# Patient Record
Sex: FEMALE | Race: WHITE | NOT HISPANIC OR LATINO
[De-identification: names, ages, dates, MRNs, and addresses within clinical notes are randomized per-mention and may not be internally consistent; named-entity substitution may affect disease eponyms.]

---

## 2023-01-23 PROBLEM — Z00.00 ENCOUNTER FOR PREVENTIVE HEALTH EXAMINATION: Status: ACTIVE | Noted: 2023-01-23

## 2023-01-25 ENCOUNTER — APPOINTMENT (OUTPATIENT)
Dept: PEDIATRIC ORTHOPEDIC SURGERY | Facility: CLINIC | Age: 53
End: 2023-01-25

## 2024-05-09 ENCOUNTER — TRANSCRIPTION ENCOUNTER (OUTPATIENT)
Age: 54
End: 2024-05-09

## 2024-05-09 ENCOUNTER — APPOINTMENT (OUTPATIENT)
Dept: PEDIATRIC ORTHOPEDIC SURGERY | Facility: CLINIC | Age: 54
End: 2024-05-09
Payer: COMMERCIAL

## 2024-05-09 VITALS — HEIGHT: 60 IN | WEIGHT: 138 LBS | BODY MASS INDEX: 27.09 KG/M2

## 2024-05-09 VITALS — TEMPERATURE: 96.6 F

## 2024-05-09 DIAGNOSIS — S92.514A NONDISPLACED FRACTURE OF PROXIMAL PHALANX OF RIGHT LESSER TOE(S), INITIAL ENCOUNTER FOR CLOSED FRACTURE: ICD-10-CM

## 2024-05-09 DIAGNOSIS — Z78.9 OTHER SPECIFIED HEALTH STATUS: ICD-10-CM

## 2024-05-09 DIAGNOSIS — Z82.49 FAMILY HISTORY OF ISCHEMIC HEART DISEASE AND OTHER DISEASES OF THE CIRCULATORY SYSTEM: ICD-10-CM

## 2024-05-09 DIAGNOSIS — Z80.9 FAMILY HISTORY OF MALIGNANT NEOPLASM, UNSPECIFIED: ICD-10-CM

## 2024-05-09 PROCEDURE — 73660 X-RAY EXAM OF TOE(S): CPT | Mod: T8

## 2024-05-09 PROCEDURE — 99202 OFFICE O/P NEW SF 15 MIN: CPT

## 2024-05-09 NOTE — PHYSICAL EXAM
[de-identified] : Exam today reveals minimal limp she has obvious swelling and tenderness to the right fourth toe no clinical deformity of significance noted.  No instability on light stress the remainder of foot exam is unremarkable.  X-rays ordered and taken today of the right fourth toe reveal nondisplaced healing fracture of the proximal phalanx

## 2024-05-09 NOTE — HISTORY OF PRESENT ILLNESS
[de-identified] : This 53-year-old seen today for evaluation of her right fourth toe.  She was well until approximately 6 weeks ago when she kicked a wall causing injury.  This was followed by swelling stiffness which has been slow to resolve.  Prior to this no complaints

## 2024-05-09 NOTE — ASSESSMENT
[FreeTextEntry1] : Impression: Fracture proximal phalanx subacute right fourth toe.  She will be treated symptomatically to avoid walking barefoot.  She will return on appearing basis

## 2025-02-18 ENCOUNTER — APPOINTMENT (OUTPATIENT)
Dept: PEDIATRIC ORTHOPEDIC SURGERY | Facility: CLINIC | Age: 55
End: 2025-02-18

## 2025-03-04 ENCOUNTER — APPOINTMENT (OUTPATIENT)
Dept: PEDIATRIC ORTHOPEDIC SURGERY | Facility: CLINIC | Age: 55
End: 2025-03-04